# Patient Record
Sex: MALE | Race: WHITE | NOT HISPANIC OR LATINO | URBAN - METROPOLITAN AREA
[De-identification: names, ages, dates, MRNs, and addresses within clinical notes are randomized per-mention and may not be internally consistent; named-entity substitution may affect disease eponyms.]

---

## 2018-11-17 PROBLEM — J06.9 URI WITH COUGH AND CONGESTION: Status: ACTIVE | Noted: 2018-11-17

## 2019-12-18 ENCOUNTER — TRANSCRIBE ORDERS (OUTPATIENT)
Dept: PHYSICAL THERAPY | Facility: CLINIC | Age: 59
End: 2019-12-18

## 2019-12-18 ENCOUNTER — TREATMENT (OUTPATIENT)
Dept: PHYSICAL THERAPY | Facility: CLINIC | Age: 59
End: 2019-12-18

## 2019-12-18 DIAGNOSIS — S46.912A STRAIN OF LEFT SHOULDER, INITIAL ENCOUNTER: Primary | ICD-10-CM

## 2019-12-18 DIAGNOSIS — M25.512 ACUTE PAIN OF LEFT SHOULDER: Primary | ICD-10-CM

## 2019-12-18 PROCEDURE — 97162 PT EVAL MOD COMPLEX 30 MIN: CPT | Performed by: PHYSICAL THERAPIST

## 2019-12-18 NOTE — PROGRESS NOTES
Physical Therapy Initial Evaluation and Plan of Care      Patient: Samy Schmitt   : 1960  Diagnosis/ICD-10 Code:  The encounter diagnosis was Acute pain of left shoulder.   Referring practitioner: WEST Soto  Date of Initial Visit: Type: THERAPY  Noted: 2019  Today's Date: 2019  Patient seen for 1 sessions         Samy Schmitt reports:  Injury to L shoulder while working on 19.  Subjective Questionnaire: QuickDASH: 22.50    Subjective Evaluation    Patient Goals  Patient goals for therapy: decreased pain         Subjective Evaluation     History of Present Illness  Date of onset: 2019  Mechanism of injury: Pt was releasing a trailer, pulling on the pin, but pin was stuck.  He felt like his shoulder pulled out of place.  Did not see MD until 2 days ago.  He thought it may go away on its own, but it has gotten worse.     Subjective comment: L shoulder pain  Patient Occupation: Works for a [employer] company, primarily driving. Pain  Current pain ratin  At best pain ratin  At worst pain ratin  Quality: burning, throbbing and dull ache (soreness)  Relieving factors: rest  Aggravating factors: sleeping and movement     Hand dominance: right  Exercise 1  Exercise Name 1: Initial HEP education and practice.             Objective       Postural Observations  Seated posture: poor  Standing posture: fair        Palpation     Additional Palpation Details  Pt denies TTP.    Cervical/Thoracic Screen   Cervical range of motion within normal limits  Cervical range of motion within normal limits with the following exceptions: L UT pain with L rotation and extension.  No effect on shoulder pain.    Neurological Testing     Sensation     Shoulder   Left Shoulder   Intact: light touch    Right Shoulder   Intact: light touch    Reflexes   Left   Biceps (C5/C6): normal (2+)  Brachioradialis (C6): normal (2+)  Triceps (C7): normal (2+)    Active Range of Motion   Left Shoulder    Normal active range of motion    Right Shoulder   Normal active range of motion    Strength/Myotome Testing     Left Shoulder   Normal muscle strength    Right Shoulder   Normal muscle strength    Tests   Cervical     Left   Negative active compression (Wilcox).     Left Shoulder   Positive clunk and crank.   Negative apprehension, belly press, crossover, full can, lift-off, Neer's, painful arc, passive horizontal adduction and Speed's.      Active Range of Motion   Left Shoulder   Flexion: 158 degrees   Extension: 75 degrees   Abduction: 141 degrees   External rotation 0°: 65 degrees   External rotation 90°: 80 degrees   Internal rotation 90°: 67 degrees          Assessment & Plan     Assessment  Impairments: activity intolerance, lacks appropriate home exercise program and pain with function  Assessment details: L shoulder injury with possible dislocation/relocation based on pt's description of the event.  Clinical tests suggest possible labral injury.  ROM and strength are WNL's and not suggestive of RC or bicep involvement.  Prognosis: good  Functional Limitations: lifting, sleeping, uncomfortable because of pain and unable to perform repetitive tasks  Goals  Plan Goals: Pt. demonstrates independence and compliance with initial HEP.  Pt. reports reduction in pain intensity to no worse than 3/10 on NPRS.  L shoulder ROM and strength are WNL's without pain.  Functional outcome measure is improved to <20 points.    Pt. demonstrates independence in advanced HEP for ongoing improvement.  L shoulder pain is no worse than 2/10 with performance of usual activities.      Plan  Therapy options: will be seen for skilled physical therapy services  Planned modality interventions: cryotherapy  Planned therapy interventions: neuromuscular re-education, home exercise program, therapeutic activities, manual therapy, strengthening, stretching, flexibility and abdominal trunk stabilization  Frequency: 2x week  Duration in  visits: 12  Treatment plan discussed with: patient  Plan details: PT per POC.        Timed:  Manual Therapy:         mins  08286;  Therapeutic Exercise:    15     mins  38847;     Neuromuscular Joslyn:        mins  90230;    Therapeutic Activity:          mins  34766;     Gait Training:           mins  00682;     Ultrasound:          mins  72887;    Electrical Stimulation:         mins  78537 ( );    Untimed:  Electrical Stimulation:         mins  63957 ( );  Mechanical Traction:         mins  96485;     Timed Treatment:   15   mins   Total Treatment:     55   mins    PT SIGNATURE: Yola Plaza, PT   DATE TREATMENT INITIATED: 12/18/2019    Initial Certification  Certification Period: 3/17/2020  I certify that the therapy services are furnished while this patient is under my care.  The services outlined above are required by this patient, and will be reviewed every 90 days.     PHYSICIAN: Brunilda Martinez PA      DATE:     Please sign and return via fax to  .. Thank you, [facility].

## 2019-12-18 NOTE — PROGRESS NOTES
Physical Therapy Initial Evaluation and Plan of Care      Patient: Samy Schmitt   : 1960  Diagnosis/ICD-10 Code:  The encounter diagnosis was Acute pain of left shoulder.   Referring practitioner: WEST Soto  Date of Initial Visit: Type: THERAPY  Noted: 2019  Today's Date: 2019  Patient seen for 1 sessions         Samy Schmitt reports:  L shoulder injury while working.  Subjective Questionnaire: QuickDASH: 22.50    Subjective Evaluation    History of Present Illness  Date of onset: 2019  Mechanism of injury: Pt was releasing a trailer, pulling on the pin, but pin was stuck.  He felt like his shoulder pulled out of place.  Did not see MD until 2 days ago.  He thought it may go away on its own, but it has gotten worse.    Subjective comment: L shoulder pain  Patient Occupation: Works for a [employer] company, primarily driving. Pain  Current pain ratin  At best pain ratin  At worst pain ratin  Quality: burning, throbbing and dull ache (soreness)  Relieving factors: rest  Aggravating factors: sleeping and movement    Hand dominance: right    Diagnostic Tests  No diagnostic tests performed    Patient Goals  Patient goals for therapy: decreased pain           Treatment                Objective       Active Range of Motion   Left Shoulder   Flexion: 158 degrees   Extension: 75 degrees   Abduction: 141 degrees   External rotation 0°: 65 degrees   External rotation 90°: 80 degrees   Internal rotation 90°: 67 degrees          Assessment/Plan    Timed:  Manual Therapy:         mins  25274;  Therapeutic Exercise:    15       mins  63268;     Neuromuscular Joslyn:        mins  41702;    Therapeutic Activity:          mins  88495;     Gait Training:           mins  48530;     Ultrasound:          mins  01843;    Electrical Stimulation:         mins  09077 ( );    Untimed:  Electrical Stimulation:         mins  48173 ( );  Mechanical Traction:         mins  19103;      Timed Treatment:   15   mins   Total Treatment:     55     mins    PT SIGNATURE: Yola H Bola, PT   DATE TREATMENT INITIATED: 12/18/2019    Initial Certification  Certification Period: 3/17/2020  I certify that the therapy services are furnished while this patient is under my care.  The services outlined above are required by this patient, and will be reviewed every 90 days.     PHYSICIAN: Brunilda Martinez PA      DATE:     Please sign and return via fax to  .. Thank you, [facility].

## 2020-01-02 ENCOUNTER — TREATMENT (OUTPATIENT)
Dept: PHYSICAL THERAPY | Facility: CLINIC | Age: 60
End: 2020-01-02

## 2020-01-02 DIAGNOSIS — M25.512 ACUTE PAIN OF LEFT SHOULDER: Primary | ICD-10-CM

## 2020-01-02 PROCEDURE — 97110 THERAPEUTIC EXERCISES: CPT | Performed by: PHYSICAL THERAPIST

## 2020-01-02 NOTE — PROGRESS NOTES
"   Physical Therapy Daily Progress Note  VISIT: 2      Samy Schmitt reports: his shoulder feels \"about the same\".  He saw MD and is being scheduled for MRI.  He reports no problems with HEP.    Subjective     Treatment  Pre-treatment pain:  0  Post-treatment pain:  0  Exercise 1  Exercise Name 1: HH depression isometric on ball  Sets/Reps 1: 15x5 sec  Exercise 2  Exercise Name 2: IR/ER at 0 abd  Equipment/Resistance 2: red band  Exercise 3  Exercise Name 3: seated ER at 90/90  Equipment/Resistance 3: 3# DB  Exercise 4  Exercise Name 4: B shoulder extension with ER  Equipment/Resistance 4: red band  Exercise 5  Exercise Name 5: diagonal row  Equipment/Resistance 5: 5#  Exercise 6  Exercise Name 6: shoulder extension (from bent row position)  Equipment/Resistance 6: 5#  Exercise 7  Exercise Name 7: Sidelying L ER             Objective     Assessment & Plan     Assessment  Assessment details: Pt demonstrates L shoulder AROM to WFL's.    Plan  Plan details: Continue PT.  Proceed as indicated based on symptom response and diagnostic findings.               Timed:  Manual Therapy:         mins  72881;  Therapeutic Exercise:    40     mins  57010;     Neuromuscular Joslyn:        mins  48156;    Therapeutic Activity:          mins  93458;     Gait Training:           mins  30772;     Ultrasound:          mins  50814;    Electrical Stimulation:         mins  71720 ( );    Untimed:  Electrical Stimulation:         mins  76570 ( );  Mechanical Traction:         mins  78482;     Timed Treatment:   40   mins   Total Treatment:     40   mins      Yola Plaza, PT  Physical Therapist                    "

## 2020-01-06 ENCOUNTER — TREATMENT (OUTPATIENT)
Dept: PHYSICAL THERAPY | Facility: CLINIC | Age: 60
End: 2020-01-06

## 2020-01-06 DIAGNOSIS — M25.512 ACUTE PAIN OF LEFT SHOULDER: Primary | ICD-10-CM

## 2020-01-06 PROCEDURE — 97110 THERAPEUTIC EXERCISES: CPT | Performed by: PHYSICAL THERAPIST

## 2020-01-06 PROCEDURE — 97010 HOT OR COLD PACKS THERAPY: CPT | Performed by: PHYSICAL THERAPIST

## 2020-01-06 NOTE — PROGRESS NOTES
Physical Therapy Daily Progress Note  VISIT: 3      Samy Schmitt reports: he was sore for a couple of days after his last session.  He describes this as muscular soreness.  He is scheduled for MRI 1 week from today.    Subjective     Treatment  Pre-treatment pain:  2  Post-treatment pain:  1  Exercise 1  Exercise Name 1: HH depression isometric on ball  Sets/Reps 1: 15x5 sec  Exercise 2  Exercise Name 2: IR/ER at 0 abd  Equipment/Resistance 2: red band  Sets/Reps 2: 15x  Exercise 3  Exercise Name 3: seated ER at 90/90  Equipment/Resistance 3: 3# DB  Sets/Reps 3: 15x  Exercise 4  Exercise Name 4: B shoulder extension with ER  Equipment/Resistance 4: red band  Sets/Reps 4: 15x  Exercise 5  Exercise Name 5: diagonal row  Equipment/Resistance 5: 5#  Sets/Reps 5: 15x  Exercise 6  Exercise Name 6: shoulder extension (from bent row position)  Equipment/Resistance 6: 5#  Sets/Reps 6: 15x  Exercise 7  Exercise Name 7: Sidelying L ER  Sets/Reps 7: 15x  Exercise 8  Exercise Name 8: supine serratus punch-painful-not continued         Ice  Ice Applied: Yes  Location: L shoulder  Rx Minutes: 10 mins  Ice S/P Rx: Yes  Patient reports will apply ice at home to involved area: Yes  Other Treatment Provided  Ice Applied: Yes  Rx Minutes: 10 mins  Patient reports will apply ice at home to involved area: Yes   Objective     Assessment & Plan     Assessment  Assessment details: Pt tolerates current exercises with mild post-exercise soreness.  He continues to be limited in tolerance of sleep positions.  Increased pain noted in supine today while attempting serratus exercise.  Pt indicates L shoulder pain when trying to lay on his back in general. He fatigues easily with visible shaking with repetitive exercises.    Plan  Plan details: Pt is scheduled for MRI next week.  Continue PT.  Progress shoulder stabilization exercises as tolerated.               Timed:  Manual Therapy:         mins  00942;  Therapeutic Exercise:    25     mins   34890;     Neuromuscular Joslyn:        mins  21640;    Therapeutic Activity:          mins  52730;     Gait Training:           mins  94976;     Ultrasound:          mins  82349;    Electrical Stimulation:         mins  55867 ( );    Untimed:  Electrical Stimulation:         mins  96633 ( );  Mechanical Traction:         mins  02492;     Timed Treatment:   25   mins   Total Treatment:     35   mins      Yola Plaza PT  Physical Therapist

## 2020-01-13 ENCOUNTER — TREATMENT (OUTPATIENT)
Dept: PHYSICAL THERAPY | Facility: CLINIC | Age: 60
End: 2020-01-13

## 2020-01-13 DIAGNOSIS — M25.512 ACUTE PAIN OF LEFT SHOULDER: Primary | ICD-10-CM

## 2020-01-13 PROCEDURE — 97110 THERAPEUTIC EXERCISES: CPT | Performed by: PHYSICAL THERAPIST

## 2020-01-13 PROCEDURE — 97010 HOT OR COLD PACKS THERAPY: CPT | Performed by: PHYSICAL THERAPIST

## 2020-01-13 NOTE — PROGRESS NOTES
Physical Therapy Daily Progress Note  VISIT: 4      Samy Schmitt reports: he had MRI earlier today.  He says his shoulder feels about the same.  It was hard to lay still in MRI tube. He is not comfortable on his back unless his L arm is supported.     Subjective     Treatment  Pre-treatment pain:  3  Post-treatment pain:  2  Exercise 1  Exercise Name 1: HH depression isometric on ball  Sets/Reps 1: 20x5 sec  Exercise 2  Exercise Name 2: IR/ER at 0 abd  Equipment/Resistance 2: red band  Sets/Reps 2: 20x each  Exercise 3  Exercise Name 3: seated ER at 90/90  Equipment/Resistance 3: 3# DB  Sets/Reps 3: 20x  Exercise 4  Exercise Name 4: R shoulder extension with ER  Equipment/Resistance 4: red band  Sets/Reps 4: 20x  Exercise 5  Exercise Name 5: diagonal row  Equipment/Resistance 5: 5#  Sets/Reps 5: 20x  Exercise 6  Exercise Name 6: shoulder extension (from bent row position)  Equipment/Resistance 6: 5#  Sets/Reps 6: 20x  Exercise 7  Exercise Name 7: Sidelying L ER  Equipment/Resistance 7: 3#  Sets/Reps 7: 20x  Exercise 8  Exercise Name 8: wall ball circles-cw/cw  Sets/Reps 8: 20x each direction             Objective     Assessment & Plan     Assessment  Assessment details: Pt tolerates exercises well in clinic and has been able to progress reps.  Pain is mild, but persistent.    Plan  Plan details: Progress strengthening and stabilization as tolerated.  MRI results not yet available.               Timed:  Manual Therapy:         mins  49118;  Therapeutic Exercise:    20     mins  12226;     Neuromuscular Joslyn:        mins  48749;    Therapeutic Activity:          mins  71255;     Gait Training:           mins  51884;     Ultrasound:          mins  25252;    Electrical Stimulation:         mins  97606 ( );    Untimed:  Electrical Stimulation:         mins  08727 ( );  Mechanical Traction:         mins  88611;     Timed Treatment:   20   mins   Total Treatment:     30   mins      Yola Plaza  PT  Physical Therapist

## 2020-01-20 ENCOUNTER — TREATMENT (OUTPATIENT)
Dept: PHYSICAL THERAPY | Facility: CLINIC | Age: 60
End: 2020-01-20

## 2020-01-20 DIAGNOSIS — M25.512 ACUTE PAIN OF LEFT SHOULDER: Primary | ICD-10-CM

## 2020-01-20 PROCEDURE — 97110 THERAPEUTIC EXERCISES: CPT | Performed by: PHYSICAL THERAPIST

## 2020-01-20 NOTE — PROGRESS NOTES
"   Physical Therapy Daily Progress Note  VISIT: 5      Samy Schmitt reports: mild left shoulder discomfort this morning.  He still cannot lay on his back without pain.  His doctor is sending him to a specialist.  He had MRI last week.  He has report with him today.    Subjective     Treatment  Pre-treatment pain:  3  Post-treatment pain:  2  Exercise 1  Exercise Name 1: HH depression isometric on ball  Sets/Reps 1: 20x5 sec  Exercise 2  Exercise Name 2: IR/ER at 0 abd  Equipment/Resistance 2: red band  Sets/Reps 2: 20x each  Exercise 3  Exercise Name 3: seated ER at 90/90  Equipment/Resistance 3: 5# DB  Sets/Reps 3: 20x  Exercise 4  Exercise Name 4: R shoulder extension with ER  Equipment/Resistance 4: red band  Sets/Reps 4: 20x  Exercise 5  Exercise Name 5: diagonal row  Equipment/Resistance 5: 5#  Sets/Reps 5: 20x  Exercise 6  Exercise Name 6: shoulder extension (from bent row position)  Equipment/Resistance 6: 5#  Sets/Reps 6: 20x  Exercise 7  Exercise Name 7: reverse throw (pn after a few reps)  Equipment/Resistance 7: red band  Exercise 8  Exercise Name 8: UBE alt.  Equipment/Resistance 8: L1  Time 8: 4'         Ice  Location: L shoulder  Rx Minutes: 10 mins  Ice S/P Rx: Yes  Patient reports will apply ice at home to involved area: Yes  Other Treatment Provided  Rx Minutes: 10 mins  Patient reports will apply ice at home to involved area: Yes   Objective   MRI results: \"Significant motion artifact which could obscure subtle abnormality.  However, no significant abnormality is identified\"    Assessment & Plan     Assessment  Assessment details: Pt indicates mild shoulder pain most of the time, which increases when he lays on his back.  He also indicated increased pain during session today with resisted activity above shoulder height and when laying prone with UE off of table.  Description of symptoms suggestive of instability.    Plan  Plan details: Pt is waiting to hear about scheduling appointment with ortho " MD.  He has PT follow up scheduled for next week.  He may cancel this appointment if still waiting for ortho MD appointment.  He will continue HEP in the interim.               Timed:  Manual Therapy:         mins  88324;  Therapeutic Exercise:    30     mins  66622;     Neuromuscular Joslyn:        mins  09680;    Therapeutic Activity:          mins  57262;     Gait Training:           mins  99964;     Ultrasound:          mins  19170;    Electrical Stimulation:         mins  02305 ( );    Untimed:  Electrical Stimulation:         mins  84937 ( );  Mechanical Traction:         mins  41514;     Timed Treatment:   30   mins   Total Treatment:     40   mins      Yola Plaza, PT  Physical Therapist

## 2020-02-24 ENCOUNTER — TRANSCRIBE ORDERS (OUTPATIENT)
Dept: PHYSICAL THERAPY | Facility: CLINIC | Age: 60
End: 2020-02-24

## 2020-02-24 ENCOUNTER — TREATMENT (OUTPATIENT)
Dept: PHYSICAL THERAPY | Facility: CLINIC | Age: 60
End: 2020-02-24

## 2020-02-24 DIAGNOSIS — M25.512 ACUTE PAIN OF LEFT SHOULDER: Primary | ICD-10-CM

## 2020-02-24 PROCEDURE — 97110 THERAPEUTIC EXERCISES: CPT | Performed by: PHYSICAL THERAPIST

## 2020-02-24 NOTE — PROGRESS NOTES
Re-Assessment / Re-Certification      Patient: Samy Schmitt   : 1960  Diagnosis/ICD-10 Code:  The encounter diagnosis was Acute pain of left shoulder.   Referring practitioner: WEST Soto  Date of Initial Visit: Type: THERAPY  Noted: 2019  Today's Date: 2020  Patient seen for 6 sessions      Subjective:   Samy Schmitt reports: he notices improvement, not needing medication as often.  He saw an ortho PA and is awaiting approval for Cortisone injection.  Subjective Questionnaire: Quick DASH 11.36   Clinical Progress: improved  Home Program Compliance: Yes  Treatment has included: therapeutic exercise    Subjective     Treatment  Exercise 1  Exercise Name 1: Reassessment  Exercise 2  Exercise Name 2: HEP progression             Objective       Active Range of Motion   Left Shoulder   Flexion: 155 degrees   Extension: 63 degrees   Abduction: 162 degrees   External rotation 90°: 81 degrees   Internal rotation 90°: 78 degrees     Strength/Myotome Testing     Left Shoulder   Normal muscle strength     Assessment & Plan     Assessment  Assessment details: Pt notices improvement.  He can lay on his left side better than before, and he can lay on his back which he could not do before.  He is not needing as much medication.  He continues to work and has been careful to not aggravate symptoms.    Plan  Plan details: Pt is awaiting approval for Cortisone injection.  We will progress strengthening and stabilization of L shoulder over the next 2-3 weeks as tolerated.      Progress toward previous goals: Partially Met  Pt. demonstrates independence and compliance with initial HEP-met.  Pt. reports reduction in pain intensity to no worse than 3/10 on NPRS-met.  L shoulder ROM and strength are WNL's without pain-met.  Functional outcome measure is improved to <20 points-met.    Pt. demonstrates independence in advanced HEP for ongoing improvement-progressing.  L shoulder pain is no worse than 2/10 with  performance of usual activities-progressing.       Recommendations: Continue as planned  Timeframe: 3 weeks  Prognosis to achieve goals: good    PT Signature: Yola Plaza PT      Based upon review of the patient's progress and continued therapy plan, it is my medical opinion that Samy Schmitt should continue physical therapy treatment at [facility]  [address]   [place] [address] [phone].    Signature: __________________________________  Brunilda Martinez PA    Timed:  Manual Therapy:         mins  91950;  Therapeutic Exercise:    30     mins  59394;     Neuromuscular Joslyn:        mins  49428;    Therapeutic Activity:          mins  93033;     Gait Training:           mins  69535;     Ultrasound:          mins  56426;    Electrical Stimulation:         mins  94191 ( );    Untimed:  Electrical Stimulation:         mins  17346 ( );  Mechanical Traction:         mins  62572;     Timed Treatment:  30    mins   Total Treatment:     30   mins

## 2020-03-09 ENCOUNTER — TREATMENT (OUTPATIENT)
Dept: PHYSICAL THERAPY | Facility: CLINIC | Age: 60
End: 2020-03-09

## 2020-03-09 DIAGNOSIS — M25.512 ACUTE PAIN OF LEFT SHOULDER: Primary | ICD-10-CM

## 2020-03-09 PROCEDURE — 97110 THERAPEUTIC EXERCISES: CPT | Performed by: PHYSICAL THERAPIST

## 2020-03-09 NOTE — PROGRESS NOTES
Physical Therapy Daily Progress Note/Discharge Note    Patient: Samy Schmitt   : 1960  Diagnosis/ICD-10 Code:  The encounter diagnosis was Acute pain of left shoulder.   Referring practitioner: Keron Rainey PA-C  Date of Initial Visit: Type: THERAPY  Noted: 2019  Today's Date: 3/9/2020  Visit:  7     Samy Schmitt reports: he received injection to shoulder last week.  He has no pain.  He is sleeping well.  He continues to work without difficulty related to his shoulder.    Subjective     Treatment  Pre-treatment pain:  0  Post-treatment pain:  0  Exercise 1  Exercise Name 1: Final DC assessment and HEP review.             Objective     Assessment & Plan     Assessment  Assessment details: Goals met.  Pt continues to work and reports no difficulties related to his shoulder.    Plan  Plan details: DC PT.               Timed:  Manual Therapy:         mins  02588;  Therapeutic Exercise:    8     mins  52019;     Neuromuscular Joslyn:        mins  73814;    Therapeutic Activity:          mins  42598;     Gait Training:           mins  27351;     Ultrasound:          mins  08691;    Electrical Stimulation:         mins  10828 ( );    Untimed:  Electrical Stimulation:         mins  03861 ( );  Mechanical Traction:         mins  17111;     Timed Treatment:   8   mins   Total Treatment:     8   mins      Yola Plaza PT  Physical Therapist

## 2024-09-16 ENCOUNTER — LAB (OUTPATIENT)
Dept: LAB | Facility: HOSPITAL | Age: 64
End: 2024-09-16
Payer: OTHER MISCELLANEOUS

## 2024-09-16 ENCOUNTER — TRANSCRIBE ORDERS (OUTPATIENT)
Dept: LAB | Facility: HOSPITAL | Age: 64
End: 2024-09-16
Payer: COMMERCIAL

## 2024-09-16 DIAGNOSIS — Z87.898 PERSONAL HISTORY OF OTHER LYMPHATIC AND HEMATOPOIETIC NEOPLASM: ICD-10-CM

## 2024-09-16 DIAGNOSIS — Z01.818 OTHER SPECIFIED PRE-OPERATIVE EXAMINATION: ICD-10-CM

## 2024-09-16 DIAGNOSIS — R73.09 IMPAIRED GLUCOSE TOLERANCE TEST: ICD-10-CM

## 2024-09-16 DIAGNOSIS — Z87.898 PERSONAL HISTORY OF OTHER LYMPHATIC AND HEMATOPOIETIC NEOPLASM: Primary | ICD-10-CM

## 2024-09-16 LAB
ANION GAP SERPL CALCULATED.3IONS-SCNC: 11.8 MMOL/L (ref 5–15)
BUN SERPL-MCNC: 10 MG/DL (ref 8–23)
BUN/CREAT SERPL: 12.7 (ref 7–25)
CALCIUM SPEC-SCNC: 9.7 MG/DL (ref 8.6–10.5)
CHLORIDE SERPL-SCNC: 103 MMOL/L (ref 98–107)
CO2 SERPL-SCNC: 22.2 MMOL/L (ref 22–29)
CREAT SERPL-MCNC: 0.79 MG/DL (ref 0.76–1.27)
DEPRECATED RDW RBC AUTO: 39.2 FL (ref 37–54)
EGFRCR SERPLBLD CKD-EPI 2021: 100.4 ML/MIN/1.73
ERYTHROCYTE [DISTWIDTH] IN BLOOD BY AUTOMATED COUNT: 12.1 % (ref 12.3–15.4)
GLUCOSE SERPL-MCNC: 81 MG/DL (ref 65–99)
HBA1C MFR BLD: 5.6 % (ref 4.8–5.6)
HCT VFR BLD AUTO: 49.2 % (ref 37.5–51)
HGB BLD-MCNC: 17 G/DL (ref 13–17.7)
MCH RBC QN AUTO: 31.2 PG (ref 26.6–33)
MCHC RBC AUTO-ENTMCNC: 34.6 G/DL (ref 31.5–35.7)
MCV RBC AUTO: 90.3 FL (ref 79–97)
PLATELET # BLD AUTO: 225 10*3/MM3 (ref 140–450)
PMV BLD AUTO: 9.7 FL (ref 6–12)
POTASSIUM SERPL-SCNC: 4.1 MMOL/L (ref 3.5–5.2)
RBC # BLD AUTO: 5.45 10*6/MM3 (ref 4.14–5.8)
SODIUM SERPL-SCNC: 137 MMOL/L (ref 136–145)
WBC NRBC COR # BLD AUTO: 5.38 10*3/MM3 (ref 3.4–10.8)

## 2024-09-16 PROCEDURE — 80048 BASIC METABOLIC PNL TOTAL CA: CPT

## 2024-09-16 PROCEDURE — 36415 COLL VENOUS BLD VENIPUNCTURE: CPT

## 2024-09-16 PROCEDURE — 83036 HEMOGLOBIN GLYCOSYLATED A1C: CPT

## 2024-09-16 PROCEDURE — 85027 COMPLETE CBC AUTOMATED: CPT
